# Patient Record
Sex: MALE | URBAN - METROPOLITAN AREA
[De-identification: names, ages, dates, MRNs, and addresses within clinical notes are randomized per-mention and may not be internally consistent; named-entity substitution may affect disease eponyms.]

---
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
## 2025-02-10 ENCOUNTER — ATHLETIC TRAINING SESSION (OUTPATIENT)
Dept: SPORTS MEDICINE | Facility: CLINIC | Age: 20
End: 2025-02-10

## 2025-02-10 DIAGNOSIS — S46.911A MUSCLE STRAIN OF RIGHT SHOULDER, INITIAL ENCOUNTER: ICD-10-CM

## 2025-02-10 DIAGNOSIS — T14.8XXA MUSCLE STRAIN: Primary | ICD-10-CM

## 2025-02-10 NOTE — PROGRESS NOTES
Reason for Encounter New Injury    Subjective:       Chief Complaint: Thony Landry is a 19 y.o. male student at [employer] who had concerns including Injury of the Right Shoulder (Right shoulder issues ).    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     Handedness: right-handed  Sport played: baseball      Level: college      Position:pitcher      Injury  This is a new problem. The current episode started in the past 7 days. The problem has been gradually improving. Nothing aggravates the symptoms. He has tried NSAIDs and heat for the symptoms. The treatment provided moderate relief.       ROS              Objective:       General: Thony is well-developed, well-nourished, appears stated age, in no acute distress, alert and oriented to time, place and person.                 Left Shoulder Exam   Left shoulder exam is normal.               Assessment:     Status: F - Full Participation    Date Seen:  2/10/25    Date of Injury:  2/7/25    Date Out:  N/A    Date Cleared:  2/10/25        Treatment/Rehab/Maintenance:           Plan:       1. Ice, Heat, Stim, Stretch   2. Physician Referral: no  3. ED Referral:no  4. Parent/Guardian Notified: No  5. All questions were answered, ath. will contact me for questions or concerns in  the interim.  6.         Eligible to use School Insurance: Yes